# Patient Record
Sex: MALE | Race: WHITE | NOT HISPANIC OR LATINO | Employment: FULL TIME | URBAN - METROPOLITAN AREA
[De-identification: names, ages, dates, MRNs, and addresses within clinical notes are randomized per-mention and may not be internally consistent; named-entity substitution may affect disease eponyms.]

---

## 2019-02-13 ENCOUNTER — TELEPHONE (OUTPATIENT)
Dept: GASTROENTEROLOGY | Facility: CLINIC | Age: 59
End: 2019-02-13

## 2019-02-13 ENCOUNTER — CLINICAL SUPPORT (OUTPATIENT)
Dept: GASTROENTEROLOGY | Facility: CLINIC | Age: 59
End: 2019-02-13

## 2019-02-13 VITALS — WEIGHT: 165 LBS | BODY MASS INDEX: 21.87 KG/M2 | HEIGHT: 73 IN

## 2019-02-13 DIAGNOSIS — Z12.11 COLON CANCER SCREENING: Primary | ICD-10-CM

## 2019-02-13 RX ORDER — MULTIVITAMIN/IRON/FOLIC ACID 18MG-0.4MG
1 TABLET ORAL DAILY
COMMUNITY

## 2019-02-13 RX ORDER — DIPHENOXYLATE HYDROCHLORIDE AND ATROPINE SULFATE 2.5; .025 MG/1; MG/1
1 TABLET ORAL DAILY
COMMUNITY

## 2019-02-13 RX ORDER — ESCITALOPRAM OXALATE 10 MG/1
10 TABLET ORAL DAILY
COMMUNITY
End: 2019-02-13 | Stop reason: CLARIF

## 2019-02-13 RX ORDER — ASPIRIN 81 MG/1
81 TABLET ORAL DAILY
COMMUNITY

## 2019-02-13 RX ORDER — ATORVASTATIN CALCIUM 10 MG/1
10 TABLET, FILM COATED ORAL DAILY
COMMUNITY

## 2019-02-13 RX ORDER — CHLORAL HYDRATE 500 MG
2000 CAPSULE ORAL DAILY
COMMUNITY

## 2019-02-13 NOTE — PROGRESS NOTES
Pt seen for colon prep; meds reviewed; suprep instructions given and reivewed;suprep rx forwarded to provider for approval; screening form signed

## 2023-05-10 ENCOUNTER — NEW PATIENT (OUTPATIENT)
Dept: URBAN - METROPOLITAN AREA CLINIC 53 | Facility: CLINIC | Age: 63
End: 2023-05-10

## 2023-05-10 DIAGNOSIS — Q12.0: ICD-10-CM

## 2023-05-10 DIAGNOSIS — H43.12: ICD-10-CM

## 2023-05-10 DIAGNOSIS — H25.13: ICD-10-CM

## 2023-05-10 DIAGNOSIS — H43.813: ICD-10-CM

## 2023-05-10 DIAGNOSIS — H40.013: ICD-10-CM

## 2023-05-10 PROCEDURE — 92134 CPTRZ OPH DX IMG PST SGM RTA: CPT

## 2023-05-10 PROCEDURE — 92004 COMPRE OPH EXAM NEW PT 1/>: CPT

## 2023-05-10 ASSESSMENT — TONOMETRY
OD_IOP_MMHG: 17
OS_IOP_MMHG: 17

## 2023-05-10 ASSESSMENT — VISUAL ACUITY
OD_CC: 20/20
OD_SC: 20/150
OS_CC: 20/80
OS_SC: 20/400@16
OD_CC: J1+@16
OD_SC: J16@16
OU_CC: J1+@16
OS_CC: J2@16
OU_SC: J16@16
OU_SC: 20/100
OU_CC: 20/20
OS_SC: 20/150

## 2023-09-13 ENCOUNTER — COMPREHENSIVE EXAM (OUTPATIENT)
Dept: URBAN - METROPOLITAN AREA CLINIC 53 | Facility: CLINIC | Age: 63
End: 2023-09-13

## 2023-09-13 DIAGNOSIS — H43.813: ICD-10-CM

## 2023-09-13 DIAGNOSIS — H40.013: ICD-10-CM

## 2023-09-13 DIAGNOSIS — Q12.0: ICD-10-CM

## 2023-09-13 DIAGNOSIS — H25.813: ICD-10-CM

## 2023-09-13 PROCEDURE — 92083 EXTENDED VISUAL FIELD XM: CPT

## 2023-09-13 PROCEDURE — 92133 CPTRZD OPH DX IMG PST SGM ON: CPT

## 2023-09-13 PROCEDURE — 92014 COMPRE OPH EXAM EST PT 1/>: CPT

## 2023-09-13 ASSESSMENT — VISUAL ACUITY
OD_CC: 20/20
OS_SC: 20/100
OU_CC: 20/20
OD_SC: 20/150
OS_CC: 20/50
OD_GLARE: 20/20
OD_GLARE: 20/20
OS_PH: 20/40
OU_CC: J1+@14"
OS_GLARE: 20/50
OS_GLARE: 20/50
OU_SC: 20/100

## 2023-09-13 ASSESSMENT — TONOMETRY
OD_IOP_MMHG: 15
OS_IOP_MMHG: 15

## 2023-11-02 ENCOUNTER — CONTACT LENSES/GLASSES VISIT (OUTPATIENT)
Dept: URBAN - METROPOLITAN AREA CLINIC 53 | Facility: CLINIC | Age: 63
End: 2023-11-02

## 2023-11-02 DIAGNOSIS — H52.02: ICD-10-CM

## 2023-11-02 DIAGNOSIS — H53.032: ICD-10-CM

## 2023-11-02 PROCEDURE — 92015 DETERMINE REFRACTIVE STATE: CPT

## 2023-11-02 ASSESSMENT — VISUAL ACUITY
OU_CC: 20/20
OU_CC: J1+/-
OD_CC: 20/20
OS_PH: 20/30+1
OS_CC: 20/40-1

## 2024-11-05 ENCOUNTER — COMPREHENSIVE EXAM (OUTPATIENT)
Dept: URBAN - METROPOLITAN AREA CLINIC 53 | Facility: CLINIC | Age: 64
End: 2024-11-05

## 2024-11-05 DIAGNOSIS — H43.813: ICD-10-CM

## 2024-11-05 DIAGNOSIS — H40.013: ICD-10-CM

## 2024-11-05 DIAGNOSIS — Q12.0: ICD-10-CM

## 2024-11-05 DIAGNOSIS — H25.813: ICD-10-CM

## 2024-11-05 PROCEDURE — 92133 CPTRZD OPH DX IMG PST SGM ON: CPT

## 2024-11-05 PROCEDURE — 99214 OFFICE O/P EST MOD 30 MIN: CPT

## 2024-11-05 PROCEDURE — 92083 EXTENDED VISUAL FIELD XM: CPT

## 2024-11-05 PROCEDURE — 76514 ECHO EXAM OF EYE THICKNESS: CPT
